# Patient Record
Sex: FEMALE | Race: WHITE | NOT HISPANIC OR LATINO | Employment: FULL TIME | URBAN - METROPOLITAN AREA
[De-identification: names, ages, dates, MRNs, and addresses within clinical notes are randomized per-mention and may not be internally consistent; named-entity substitution may affect disease eponyms.]

---

## 2021-01-12 ENCOUNTER — AMBULATORY - HEALTHEAST (OUTPATIENT)
Dept: MEDSURG UNIT | Facility: HOSPITAL | Age: 86
End: 2021-01-12

## 2021-01-12 ENCOUNTER — HOME CARE/HOSPICE - HEALTHEAST (OUTPATIENT)
Dept: HOME HEALTH SERVICES | Facility: HOME HEALTH | Age: 86
End: 2021-01-12

## 2021-06-14 NOTE — PROGRESS NOTES
Received Oxygen intake, reviewed chart; all documents present in order to set this patient up with home oxygen.  - Reviewed insurance: patient has [employer] which is not in network with us as of the new year.  - Spoke with Dixie to let her know I was having a hard time getting in touch with the patient to offer choice; Dixie went down to the patients room to have her answer the next phone call.  - Spoke with the patient, offered choice; Monique had asked me to contact her daughter Yelena as she coordinates all her care.  - Spoke with Yelena, and discussed that [facility] is no longer in Network with her mothers insurance as of 1/1/2021. She explained that she called [employer] to get everything else approved through them, but wasn't sure about oxygen. I let her know that I can either get her mom set up or I could send her information to a company who is in network with [employer]. Yelena asked about the potential cost. I let her know that of the patients that I have spoke with that also have [employer], say that the change in price between the two companies is minimal. Yelena had asked how long both Companies would take to deliver, I gave her a normal 2 hour window for [employer], and an hour or less for us to deliver. Yelena decided that [facility] was the best option for her mother and that she wasn't concerned with a small charge.  - spoke with Dixie and let her know that we would be there within the hour

## 2021-06-16 PROBLEM — U07.1 PNEUMONIA DUE TO COVID-19 VIRUS: Status: ACTIVE | Noted: 2020-12-31

## 2021-06-16 PROBLEM — J12.82 PNEUMONIA DUE TO COVID-19 VIRUS: Status: ACTIVE | Noted: 2020-12-31

## 2022-09-10 ENCOUNTER — APPOINTMENT (OUTPATIENT)
Dept: CT IMAGING | Facility: CLINIC | Age: 87
End: 2022-09-10
Attending: EMERGENCY MEDICINE
Payer: COMMERCIAL

## 2022-09-10 ENCOUNTER — HOSPITAL ENCOUNTER (EMERGENCY)
Facility: CLINIC | Age: 87
Discharge: HOME | End: 2022-09-10
Attending: EMERGENCY MEDICINE | Admitting: EMERGENCY MEDICINE
Payer: COMMERCIAL

## 2022-09-10 VITALS
BODY MASS INDEX: 30 KG/M2 | HEART RATE: 98 BPM | DIASTOLIC BLOOD PRESSURE: 88 MMHG | RESPIRATION RATE: 18 BRPM | HEIGHT: 62 IN | TEMPERATURE: 99.4 F | SYSTOLIC BLOOD PRESSURE: 142 MMHG | OXYGEN SATURATION: 96 % | WEIGHT: 163 LBS

## 2022-09-10 DIAGNOSIS — K57.12 DIVERTICULITIS OF JEJUNUM: ICD-10-CM

## 2022-09-10 DIAGNOSIS — R31.9 HEMATURIA, UNSPECIFIED TYPE: ICD-10-CM

## 2022-09-10 LAB
ALBUMIN SERPL BCG-MCNC: 4.4 G/DL (ref 3.5–5.2)
ALBUMIN UR-MCNC: NEGATIVE MG/DL
ALP SERPL-CCNC: 87 U/L (ref 35–104)
ALT SERPL W P-5'-P-CCNC: 14 U/L (ref 10–35)
ANION GAP SERPL CALCULATED.3IONS-SCNC: 12 MMOL/L (ref 7–15)
APPEARANCE UR: ABNORMAL
AST SERPL W P-5'-P-CCNC: 21 U/L (ref 10–35)
BACTERIA #/AREA URNS HPF: ABNORMAL /HPF
BASOPHILS # BLD AUTO: 0 10E3/UL (ref 0–0.2)
BASOPHILS NFR BLD AUTO: 0 %
BILIRUB SERPL-MCNC: 0.6 MG/DL
BILIRUB UR QL STRIP: NEGATIVE
BUN SERPL-MCNC: 9.3 MG/DL (ref 8–23)
CALCIUM SERPL-MCNC: 9.6 MG/DL (ref 8.8–10.2)
CHLORIDE SERPL-SCNC: 102 MMOL/L (ref 98–107)
COLOR UR AUTO: YELLOW
CREAT SERPL-MCNC: 0.65 MG/DL (ref 0.51–0.95)
DEPRECATED HCO3 PLAS-SCNC: 25 MMOL/L (ref 22–29)
EOSINOPHIL # BLD AUTO: 0 10E3/UL (ref 0–0.7)
EOSINOPHIL NFR BLD AUTO: 0 %
ERYTHROCYTE [DISTWIDTH] IN BLOOD BY AUTOMATED COUNT: 13.6 % (ref 10–15)
GFR SERPL CREATININE-BSD FRML MDRD: 85 ML/MIN/1.73M2
GLUCOSE SERPL-MCNC: 118 MG/DL (ref 70–99)
GLUCOSE UR STRIP-MCNC: NEGATIVE MG/DL
HCT VFR BLD AUTO: 42.1 % (ref 35–47)
HGB BLD-MCNC: 13.9 G/DL (ref 11.7–15.7)
HGB UR QL STRIP: ABNORMAL
HOLD SPECIMEN: NORMAL
IMM GRANULOCYTES # BLD: 0.1 10E3/UL
IMM GRANULOCYTES NFR BLD: 0 %
KETONES UR STRIP-MCNC: NEGATIVE MG/DL
LEUKOCYTE ESTERASE UR QL STRIP: NEGATIVE
LIPASE SERPL-CCNC: 15 U/L (ref 13–60)
LYMPHOCYTES # BLD AUTO: 0.8 10E3/UL (ref 0.8–5.3)
LYMPHOCYTES NFR BLD AUTO: 7 %
MCH RBC QN AUTO: 31 PG (ref 26.5–33)
MCHC RBC AUTO-ENTMCNC: 33 G/DL (ref 31.5–36.5)
MCV RBC AUTO: 94 FL (ref 78–100)
MONOCYTES # BLD AUTO: 0.8 10E3/UL (ref 0–1.3)
MONOCYTES NFR BLD AUTO: 7 %
NEUTROPHILS # BLD AUTO: 10.6 10E3/UL (ref 1.6–8.3)
NEUTROPHILS NFR BLD AUTO: 86 %
NITRATE UR QL: NEGATIVE
NRBC # BLD AUTO: 0 10E3/UL
NRBC BLD AUTO-RTO: 0 /100
PH UR STRIP: 6 [PH] (ref 5–7)
PLATELET # BLD AUTO: 243 10E3/UL (ref 150–450)
POTASSIUM SERPL-SCNC: 4.2 MMOL/L (ref 3.4–5.3)
PROT SERPL-MCNC: 7.6 G/DL (ref 6.4–8.3)
RADIOLOGIST FLAGS: ABNORMAL
RBC # BLD AUTO: 4.49 10E6/UL (ref 3.8–5.2)
RBC URINE: >182 /HPF
SODIUM SERPL-SCNC: 139 MMOL/L (ref 136–145)
SP GR UR STRIP: 1.01 (ref 1–1.03)
TROPONIN T SERPL HS-MCNC: 8 NG/L
UROBILINOGEN UR STRIP-MCNC: NORMAL MG/DL
WBC # BLD AUTO: 12.3 10E3/UL (ref 4–11)
WBC URINE: 13 /HPF

## 2022-09-10 PROCEDURE — 99285 EMERGENCY DEPT VISIT HI MDM: CPT | Mod: 25 | Performed by: EMERGENCY MEDICINE

## 2022-09-10 PROCEDURE — 80053 COMPREHEN METABOLIC PANEL: CPT | Performed by: EMERGENCY MEDICINE

## 2022-09-10 PROCEDURE — 83690 ASSAY OF LIPASE: CPT | Performed by: EMERGENCY MEDICINE

## 2022-09-10 PROCEDURE — 84484 ASSAY OF TROPONIN QUANT: CPT | Performed by: EMERGENCY MEDICINE

## 2022-09-10 PROCEDURE — 93010 ELECTROCARDIOGRAM REPORT: CPT | Performed by: EMERGENCY MEDICINE

## 2022-09-10 PROCEDURE — 93005 ELECTROCARDIOGRAM TRACING: CPT | Performed by: EMERGENCY MEDICINE

## 2022-09-10 PROCEDURE — 250N000011 HC RX IP 250 OP 636: Performed by: EMERGENCY MEDICINE

## 2022-09-10 PROCEDURE — 85004 AUTOMATED DIFF WBC COUNT: CPT | Performed by: EMERGENCY MEDICINE

## 2022-09-10 PROCEDURE — 82040 ASSAY OF SERUM ALBUMIN: CPT | Performed by: EMERGENCY MEDICINE

## 2022-09-10 PROCEDURE — 250N000013 HC RX MED GY IP 250 OP 250 PS 637: Performed by: EMERGENCY MEDICINE

## 2022-09-10 PROCEDURE — 250N000009 HC RX 250: Performed by: EMERGENCY MEDICINE

## 2022-09-10 PROCEDURE — 74177 CT ABD & PELVIS W/CONTRAST: CPT

## 2022-09-10 PROCEDURE — 81001 URINALYSIS AUTO W/SCOPE: CPT | Performed by: EMERGENCY MEDICINE

## 2022-09-10 PROCEDURE — 87186 SC STD MICRODIL/AGAR DIL: CPT | Performed by: EMERGENCY MEDICINE

## 2022-09-10 PROCEDURE — 36415 COLL VENOUS BLD VENIPUNCTURE: CPT | Performed by: EMERGENCY MEDICINE

## 2022-09-10 RX ORDER — IOPAMIDOL 755 MG/ML
80 INJECTION, SOLUTION INTRAVASCULAR ONCE
Status: COMPLETED | OUTPATIENT
Start: 2022-09-10 | End: 2022-09-10

## 2022-09-10 RX ORDER — ACETAMINOPHEN 325 MG/1
650 TABLET ORAL ONCE
Status: COMPLETED | OUTPATIENT
Start: 2022-09-10 | End: 2022-09-10

## 2022-09-10 RX ADMIN — SODIUM CHLORIDE 60 ML: 9 INJECTION, SOLUTION INTRAVENOUS at 11:36

## 2022-09-10 RX ADMIN — ACETAMINOPHEN 650 MG: 325 TABLET, COATED ORAL at 13:14

## 2022-09-10 RX ADMIN — IOPAMIDOL 80 ML: 755 INJECTION, SOLUTION INTRAVENOUS at 11:36

## 2022-09-10 ASSESSMENT — ACTIVITIES OF DAILY LIVING (ADL)
ADLS_ACUITY_SCORE: 35
ADLS_ACUITY_SCORE: 35

## 2022-09-10 NOTE — DISCHARGE INSTRUCTIONS
You have diverticulitis.     Take augmentin twice daily for 10 days.     Follow up with your primary provider in one week if symptoms not resolved.     Emergency department he develop a fever greater 100.4, persistent nausea vomiting, uncontrollable pain, or other new symptoms you find concerning.

## 2022-09-10 NOTE — ED PROVIDER NOTES
"  History     Chief Complaint   Patient presents with     Abdominal Pain     HPI  Monique Babin is a 87 year old female with 3 days of abdominal pain.  Pain initially in lower abdomen but now more bothersome in her upper abdomen.  Has associated nausea but no vomiting.  No measured fever.  No bowel movement for several days.  No blood in stool or melena.  Also has a pressure sensation in her chest which comes and goes.  This is not new but has been occurring recently.  No provocative or palliating features.  Pain in her abdomen is mild and she has been taking Tylenol which she says \"does nothing\".  She does have a history of atrial fibrillation and is on Eliquis and understand that she cannot take ibuprofen for her pain which she would prefer.  Her daughter says that her urine has been concentrated and has a bad smell.  Patient cannot smell as she lost this since after COVID infection in 2020.  No cough, sore throat, headache.  No extremity edema    The patient's PMHx, Surgical Hx, Allergies, and Medications were all reviewed with the patient.    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    Past Surgical History:    Past Surgical History:   Procedure Laterality Date     PICC INSERTION - " "TRIPLE LUMEN  1/1/2021            Family History:    No family history on file.    Social History:  Marital Status:   [2]  Social History     Substance Use Topics     Alcohol use: No     Drug use: No        Medications:    amoxicillin-clavulanate (AUGMENTIN) 875-125 MG tablet  ADVIL OR  ASPIRIN PO  AZITHROMYCIN PO  RANITIDINE HCL PO  TYLENOL OR          Review of Systems  A complete review of systems performed and is otherwise negative except as noted in the HPI    Physical Exam   BP: (!) 155/84  Pulse: 103  Temp: 99.4  F (37.4  C)  Resp: 18  Height: 157.5 cm (5' 2\")  Weight: 73.9 kg (163 lb)  SpO2: 96 %    Physical Exam  GEN: Awake, alert, and cooperative. No acute distress.  Resting comfortably on cart  HENT: MMM. External ears and nose normal bilaterally.  EYES: EOM intact. Conjunctiva clear. No discharge.   NECK: Symmetric, freely mobile.   CV : Regular rate.  Extremities warm and well perfused  PULM: Normal effort. No wheezes, rales, or rhonchi bilaterally.  ABD: No focal tenderness.  Complains of right upper and than right lower abdominal discomfort with palpation of her left lower quadrant.  Abdomen is soft and nondistended.  NEURO: Normal speech. Following commands. CN II-XII grossly intact. Answering questions and interacting appropriately.   EXT: No gross deformity.  No pitting pedal or pretibial edema  INT: Warm. No diaphoresis. Normal color.        ED Course        Procedures         EKG: Interpreted by myself.  Sinus rhythm with rate of 102 beats per minute.  Axis is normal.  Normal R wave progression.  Normal intervals.  No ST segment elevation or depression.  Baseline artifact particularly in limb leads.  Impression: Sinus tachycardia.    Critical Care time:  none               Results for orders placed or performed during the hospital encounter of 09/10/22 (from the past 24 hour(s))   [place] Draw    Narrative    The following orders were created for panel order [place] Draw.  Procedure        "                        Abnormality         Status                     ---------                               -----------         ------                     Extra Blue Top Tube[[id number]]                              Final result               Extra Red Top Tube[[id number]]                               Final result               Extra Green Top (Lithium...[[id number]]                      Final result               Extra Purple Top Tube[[id number]]                            Final result                 Please view results for these tests on the individual orders.   Extra Blue Top Tube   Result Value Ref Range    Hold Specimen JIC    Extra Red Top Tube   Result Value Ref Range    Hold Specimen JIC    Extra Green Top (Lithium Heparin) Tube   Result Value Ref Range    Hold Specimen JIC    Extra Purple Top Tube   Result Value Ref Range    Hold Specimen JIC    Comprehensive metabolic panel   Result Value Ref Range    Sodium 139 136 - 145 mmol/L    Potassium 4.2 3.4 - 5.3 mmol/L    Creatinine 0.65 0.51 - 0.95 mg/dL    Urea Nitrogen 9.3 8.0 - 23.0 mg/dL    Chloride 102 98 - 107 mmol/L    Carbon Dioxide (CO2) 25 22 - 29 mmol/L    Anion Gap 12 7 - 15 mmol/L    Glucose 118 (H) 70 - 99 mg/dL    Calcium 9.6 8.8 - 10.2 mg/dL    Protein Total 7.6 6.4 - 8.3 g/dL    Albumin 4.4 3.5 - 5.2 g/dL    Bilirubin Total 0.6 <=1.2 mg/dL    Alkaline Phosphatase 87 35 - 104 U/L    AST 21 10 - 35 U/L    ALT 14 10 - 35 U/L    GFR Estimate 85 >60 mL/min/1.73m2   CBC with platelets differential    Narrative    The following orders were created for panel order CBC with platelets differential.  Procedure                               Abnormality         Status                     ---------                               -----------         ------                     CBC with platelets and d...[[id number]]  Abnormal            Final result                 Please view results for these tests on the individual orders.   Lipase   Result Value Ref Range     Lipase 15 13 - 60 U/L   CBC with platelets and differential   Result Value Ref Range    WBC Count 12.3 (H) 4.0 - 11.0 10e3/uL    RBC Count 4.49 3.80 - 5.20 10e6/uL    Hemoglobin 13.9 11.7 - 15.7 g/dL    Hematocrit 42.1 35.0 - 47.0 %    MCV 94 78 - 100 fL    MCH 31.0 26.5 - 33.0 pg    MCHC 33.0 31.5 - 36.5 g/dL    RDW 13.6 10.0 - 15.0 %    Platelet Count 243 150 - 450 10e3/uL    % Neutrophils 86 %    % Lymphocytes 7 %    % Monocytes 7 %    % Eosinophils 0 %    % Basophils 0 %    % Immature Granulocytes 0 %    NRBCs per 100 WBC 0 <1 /100    Absolute Neutrophils 10.6 (H) 1.6 - 8.3 10e3/uL    Absolute Lymphocytes 0.8 0.8 - 5.3 10e3/uL    Absolute Monocytes 0.8 0.0 - 1.3 10e3/uL    Absolute Eosinophils 0.0 0.0 - 0.7 10e3/uL    Absolute Basophils 0.0 0.0 - 0.2 10e3/uL    Absolute Immature Granulocytes 0.1 <=0.4 10e3/uL    Absolute NRBCs 0.0 10e3/uL   Troponin T, High Sensitivity   Result Value Ref Range    Troponin T, High Sensitivity 8 <=14 ng/L   CT Abdomen Pelvis w Contrast   Result Value Ref Range    Radiologist flags Jejunal diverticulitis (Urgent)     Narrative    EXAM: CT ABDOMEN PELVIS W CONTRAST  LOCATION: [facility]  DATE/TIME: 9/10/2022 11:50 AM    INDICATION: Nonlocalizing abdominal pain associated with nausea.  COMPARISON: CT abdomen pelvis 01/29/2018.  TECHNIQUE: CT scan of the abdomen and pelvis was performed following injection of IV contrast. Multiplanar reformats were obtained. Dose reduction techniques were used.  CONTRAST: 80 mL Isovue 370     FINDINGS:   LOWER CHEST: A 1.1 cm solid nodule within the left lower lobe is unchanged since 12/31/2020. Small hiatal hernia.    HEPATOBILIARY: Status post cholecystectomy. Mildly dilated central biliary tree is likely related to postcholecystectomy reservoir effect. No focal liver lesions.    PANCREAS: Normal.    SPLEEN: Normal.    ADRENAL GLANDS: Normal.    KIDNEYS/BLADDER: Two nonobstructing calculi at the mid and lower poles  measuring up to 3 mm. No left renal calculi. Several benign renal cysts measuring up to 5.7 cm at the inferior pole on the left; these do not require follow-up. No hydronephrosis.    BOWEL: Acute uncomplicated jejunal diverticulitis, involving a large jejunal diverticulum within the left hemiabdomen and some additional smaller adjacent diverticuli (2/#11). There is inflammation of the surrounding mesenteric fat and reactive wall   thickening of the adjacent jejunum. No abscess or signs of perforation. No bowel obstruction. Scattered noninflamed descending and sigmoid colonic diverticuli.    LYMPH NODES: Normal.    VASCULATURE: Patent portal, splenic, superior mesenteric veins. No abdominal aortic aneurysm.    PELVIC ORGANS: Small calcified uterine fibroid.    MUSCULOSKELETAL: Multilevel degenerative change of the spine. Degenerative changes of both hips.      Impression    IMPRESSION:     1.  Acute uncomplicated jejunal diverticulitis.    2.  A 1.1 cm left lower lobe pulmonary nodule is unchanged since 12/31/2020 and likely benign.    3.  Small hiatal hernia.    4.  Nonobstructing right renal calculi measuring up to 3 mm.    [Urgent Result: Jejunal diverticulitis]    Finding was identified on 9/10/2022 12:08 PM.     Dr. Palacio was contacted by me on 9/10/2022 12:13 PM and verbalized understanding of the critical result.   UA with Microscopic reflex to Culture    Specimen: Urine, Midstream   Result Value Ref Range    Color Urine Yellow Colorless, Straw, Light Yellow, Yellow    Appearance Urine Slightly Cloudy (A) Clear    Glucose Urine Negative Negative mg/dL    Bilirubin Urine Negative Negative    Ketones Urine Negative Negative mg/dL    Specific Gravity Urine 1.015 1.003 - 1.035    Blood Urine Large (A) Negative    pH Urine 6.0 5.0 - 7.0    Protein Albumin Urine Negative Negative mg/dL    Urobilinogen Urine Normal Normal, 2.0 mg/dL    Nitrite Urine Negative Negative    Leukocyte Esterase Urine Negative Negative     Bacteria Urine Few (A) None Seen /HPF    RBC Urine >182 (H) <=2 /HPF    WBC Urine 13 (H) <=5 /HPF    Narrative    Urine Culture ordered based on laboratory criteria       Medications   iopamidol (ISOVUE-370) solution 80 mL (80 mLs Intravenous Given 9/10/22 1136)   sodium chloride 0.9 % bag 500mL for CT scan flush use (60 mLs Intravenous Given 9/10/22 1136)   acetaminophen (TYLENOL) tablet 650 mg (650 mg Oral Given 9/10/22 1314)       Assessments & Plan (with Medical Decision Making)   87 year old female with past medical history notable for atrial fibrillation (on apixaban), idiopathic pancreatitis, mild intermittent asthma, with 3 days of abdominal discomfort associated with nausea and chest pressure.  On examination, she has right-sided abdominal tenderness with palpation of left side of abdomen.  Abdomen is soft and nondistended.  CBC with leukocytosis 12.3 with a left shift.  CMP grossly normal, glucose 118.  Lipase 15.  CT scan of abdomen pelvis with jejunal diverticulitis without abscess or perforation.  Received a call from radiology with these findings.  EKG without evidence of acute ischemia high-sensitivity troponin is not elevated.  Symptoms been present for several days not expect an elevation of troponin this point if this was ACS.  Urinalysis with hematuria and 13 WBCs.  Nitrate negative, leukocyte esterase negative.  We will treat diverticulitis with Augmentin.  Urine culture pending.  This will cover many urinary pathogen and thinks no coverage is appropriate pending urine culture.  On chart review I did not see any prior positive urine cultures to check sensitivities.  Patient tolerating oral hydration without difficulty.  Do not think IV fluids necessary at this point.  Patient initially declined pain medications and now was asking for pain medication shortly before time of discharge.  She is on apixaban so NSAIDs not given.  Considered hydrocodone while in the ED to see how she tolerates  however she does have an allergy listed to morphine with unknown response.  Patient says she has not had a bowel movement in several days and I think this would only worsen her constipation.  She was given 650 mg of acetaminophen for her pain.    Prescription for Augmentin sent to preferred pharmacy.  Follow-up and ED return precautions discussed.  Patient and her daughter expressed agreement understanding of plan and discharged in stable condition.        I have reviewed the nursing notes.         New Prescriptions    AMOXICILLIN-CLAVULANATE (AUGMENTIN) 875-125 MG TABLET    Take 1 tablet by mouth 2 times daily for 10 days       Final diagnoses:   Diverticulitis of jejunum   Hematuria, unspecified type     Clarence Palacio MD    9/10/2022   [facility] EMERGENCY DEPT    Disclaimer: This note consists of words and symbols derived from keyboarding and dictation using voice recognition software.  As a result, there may be errors that have gone undetected.  Please consider this when interpreting information found in this note.             Clarence Palacio MD  09/10/22 3408

## 2022-09-10 NOTE — ED TRIAGE NOTES
"Pt here with diffuse abdominal pain. Pt is hard of hearing, so daughter is here to help. Pt states that pain started a few days ago. Last BM was Wednesday. Pt states that she \"can only pass raisin sized, and its hard to go\".      Triage Assessment     Row Name 09/10/22 0917       Triage Assessment (Adult)    Airway WDL WDL       Respiratory WDL    Respiratory WDL WDL       Skin Circulation/Temperature WDL    Skin Circulation/Temperature WDL WDL       Cardiac WDL    Cardiac WDL WDL       Peripheral/Neurovascular WDL    Peripheral Neurovascular WDL WDL       Cognitive/Neuro/Behavioral WDL    Cognitive/Neuro/Behavioral WDL WDL              "

## 2022-09-10 NOTE — ED NOTES
"Pt c/o epigastric pain that radiates upwards.  Pain also started in lower abd.  No n/v/d.  No new change in bowel habits.  Daughter states her urine is foul smelling.  Pt denies pain or frequency with urination.  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  Lying down helps pain.  Standing and walking makes pain worse.   "

## 2022-09-11 NOTE — RESULT ENCOUNTER NOTE
[facility] Emergency Dept discharge antibiotic (if prescribed): Amoxicillin-Clavulanate (Augmentin) 875-125 mg PO tablet, 1 tablet by mouth 2 times daily for 10 days   Date of Rx (if applicable):  9/10/22  No changes in treatment per [facility] ED Lab Result Urine culture protocol.

## 2022-09-13 LAB
BACTERIA UR CULT: ABNORMAL
BACTERIA UR CULT: ABNORMAL